# Patient Record
Sex: MALE | Race: WHITE | URBAN - METROPOLITAN AREA
[De-identification: names, ages, dates, MRNs, and addresses within clinical notes are randomized per-mention and may not be internally consistent; named-entity substitution may affect disease eponyms.]

---
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
## 2022-04-07 ENCOUNTER — HISTORICAL (OUTPATIENT)
Dept: ADMINISTRATIVE | Facility: HOSPITAL | Age: 22
End: 2022-04-07

## 2022-04-24 VITALS
DIASTOLIC BLOOD PRESSURE: 67 MMHG | SYSTOLIC BLOOD PRESSURE: 110 MMHG | HEIGHT: 65 IN | WEIGHT: 151 LBS | BODY MASS INDEX: 25.16 KG/M2

## 2022-04-30 NOTE — OP NOTE
DATE OF SURGERY:    10/10/2019    SURGEON:  Justin Garcia Jr, MD  ASSISTANT:  none    PREOPERATIVE DIAGNOSIS:  Right prepatellar bursitis.    POSTOPERATIVE DIAGNOSIS:  Right prepatellar bursitis.    PROCEDURE:  Right knee arthroscopic-assisted prepatellar bursectomy.    ANESTHESIA:  General plus local.    COMPLICATIONS:  None.    HISTORY OF PRESENT ILLNESS:  Mahad is a very pleasant 19-year-old who has had recurrent swelling over his patella.  We have aspirated it without relief.  I recommended debridement.  I discussed with him the risks, benefits, and alternatives to therapy, and elected to proceed.    DESCRIPTION OF PROCEDURE:  Mahad was initially seen in preoperative unit where his history and physical were without change.  His right knee was marked.  Consents were reviewed.  All questions were answered.  He was taken to the operating room and placed supine on the operating room table.  General anesthesia was induced.  His right lower extremity was prepped and draped in a sterile fashion.  Attending led time-out confirmed the operative side.  Preoperative antibiotics were administered and we began the procedure.       I started by introducing a trochar into his prepatellar space, insufflated this area and was able to visualize his bursa in its entirety.  I made a superior portal as well and then was able to use a combination of a shaver and biter in order to remove the bursa in its entirety.  I was able to define the patellar tendon.  I was able to visualize the patella as well as the quad tendon.  I removed the entire bursa.  Following this, I obtained     hemostasis and then drained the endoscopic fluid.  Local anesthesia was injected.  Portals were closed.  He was awoken from anesthesia and transferred to the postoperative unit in good condition.      ______________________________  Justin Garcia Jr, MD    BEE/UM  DD:  10/10/2019  Time:  08:29AM  DT:  10/10/2019  Time:  08:48AM  Job #:  [id number]

## 2022-04-30 NOTE — OP NOTE
Patient:   Mahad Canseco             MRN: [id number]            FIN: [id number]               Age:   18 years     Sex:  Male     :  2000   Associated Diagnoses:   Hematoma of pinna, right ear   Author:   Juan Barragan MD      Operative Note   Operative Information   Date/ Time:  2018 13:28:00.     Procedures Performed: Procedure Code   Incision and drainage of right auricular hematoma (CPT4 44144) performed by Juan Barragan MD on 2018 at 18 Years.  Associated diagnosis is Hematoma of pinna, right ear..     Indications: 18-year-old white male who had developed nontender swelling involving the pinna of the left ear following wrestling practice earlier this week.  Clinically this appeared to be an auricular hematoma..     Preoperative Diagnosis: Hematoma of pinna, right ear (RWK51-EX H61.121).     Postoperative Diagnosis: Hematoma of pinna, right ear ([id number] H61.121).     Surgeon: Juan Barragan MD.     Anesthesia: General inhalational anesthesia.     Speciman Removed: None.     Description of Procedure/Findings/    Complications: Patient was brought to the operating room and placed on the operating room table in supine position after which general inhalational anesthesia was induced.  His right ear was then prepped and draped in sterile fashion.  Examination of the ear showed the presence of fluctuant swelling without overlying erythema involving the pinna between the root of the helix and the triangular fossa with some extension onto a portion of the ariane.  A 10 cc syringe with an 18-gauge needle was used to aspirate this area.  This resulted in aspiration of 1-1/2-2 cc of bloody serous fluid.  Small incision was then also made in this area to allow egress of any additional fluid.  A compression dressing was made using a cotton dental roll which was placed over the area of the hematoma and then secured to the ear using transmural 3-0 Prolene suture which were passed from the  front of the ear through the back of the ear and then back through the back of the ear out through the front of the ear.  The back of the ear the sutures were passed through red rubber catheter to help protect the skin.  3 of the sutures were used.  Antibiotic ointment was then applied to the pressure dressing and sutures.  The procedure was then terminated and the patient was awoken and brought to the recovery room in stable condition.  He tolerated the procedure well..     Esimated blood loss: loss less than  5  cc.     Findings: Significant as mentioned as above..     Complications: None.     Copy of operative report to Dr. Cyndi Cruz.